# Patient Record
Sex: MALE | Race: WHITE | Employment: FULL TIME | URBAN - METROPOLITAN AREA
[De-identification: names, ages, dates, MRNs, and addresses within clinical notes are randomized per-mention and may not be internally consistent; named-entity substitution may affect disease eponyms.]

---

## 2018-12-21 ENCOUNTER — APPOINTMENT (OUTPATIENT)
Dept: CT IMAGING | Facility: CLINIC | Age: 53
End: 2018-12-21
Attending: EMERGENCY MEDICINE
Payer: COMMERCIAL

## 2018-12-21 ENCOUNTER — HOSPITAL ENCOUNTER (EMERGENCY)
Facility: CLINIC | Age: 53
Discharge: HOME | End: 2018-12-21
Attending: EMERGENCY MEDICINE | Admitting: EMERGENCY MEDICINE
Payer: COMMERCIAL

## 2018-12-21 VITALS
HEART RATE: 69 BPM | TEMPERATURE: 98 F | RESPIRATION RATE: 16 BRPM | HEIGHT: 69 IN | SYSTOLIC BLOOD PRESSURE: 108 MMHG | DIASTOLIC BLOOD PRESSURE: 64 MMHG | BODY MASS INDEX: 21.71 KG/M2 | OXYGEN SATURATION: 98 % | WEIGHT: 146.6 LBS

## 2018-12-21 DIAGNOSIS — R10.84 ABDOMINAL PAIN, GENERALIZED: ICD-10-CM

## 2018-12-21 DIAGNOSIS — R11.2 NON-INTRACTABLE VOMITING WITH NAUSEA, UNSPECIFIED VOMITING TYPE: ICD-10-CM

## 2018-12-21 LAB
ALBUMIN SERPL-MCNC: 3.9 G/DL (ref 3.4–5)
ALBUMIN UR-MCNC: NEGATIVE MG/DL
ALP SERPL-CCNC: 84 U/L (ref 40–150)
ALT SERPL W P-5'-P-CCNC: 24 U/L (ref 0–70)
ANION GAP SERPL CALCULATED.3IONS-SCNC: 8 MMOL/L (ref 3–14)
APPEARANCE UR: CLEAR
AST SERPL W P-5'-P-CCNC: 20 U/L (ref 0–45)
BASOPHILS # BLD AUTO: 0 10E9/L (ref 0–0.2)
BASOPHILS NFR BLD AUTO: 0.2 %
BILIRUB SERPL-MCNC: 2 MG/DL (ref 0.2–1.3)
BILIRUB UR QL STRIP: NEGATIVE
BUN SERPL-MCNC: 19 MG/DL (ref 7–30)
CALCIUM SERPL-MCNC: 8.8 MG/DL (ref 8.5–10.1)
CHLORIDE SERPL-SCNC: 108 MMOL/L (ref 94–109)
CO2 SERPL-SCNC: 27 MMOL/L (ref 20–32)
COLOR UR AUTO: ABNORMAL
CREAT SERPL-MCNC: 1.02 MG/DL (ref 0.66–1.25)
DIFFERENTIAL METHOD BLD: ABNORMAL
EOSINOPHIL # BLD AUTO: 0.4 10E9/L (ref 0–0.7)
EOSINOPHIL NFR BLD AUTO: 2.5 %
ERYTHROCYTE [DISTWIDTH] IN BLOOD BY AUTOMATED COUNT: 12.7 % (ref 10–15)
GFR SERPL CREATININE-BSD FRML MDRD: 84 ML/MIN/{1.73_M2}
GLUCOSE SERPL-MCNC: 136 MG/DL (ref 70–99)
GLUCOSE UR STRIP-MCNC: NEGATIVE MG/DL
HCT VFR BLD AUTO: 44 % (ref 40–53)
HGB BLD-MCNC: 15.5 G/DL (ref 13.3–17.7)
HGB UR QL STRIP: NEGATIVE
IMM GRANULOCYTES # BLD: 0 10E9/L (ref 0–0.4)
IMM GRANULOCYTES NFR BLD: 0.1 %
INTERPRETATION ECG - MUSE: NORMAL
KETONES UR STRIP-MCNC: NEGATIVE MG/DL
LACTATE BLD-SCNC: 2 MMOL/L (ref 0.7–2)
LEUKOCYTE ESTERASE UR QL STRIP: NEGATIVE
LIPASE SERPL-CCNC: 112 U/L (ref 73–393)
LYMPHOCYTES # BLD AUTO: 1.1 10E9/L (ref 0.8–5.3)
LYMPHOCYTES NFR BLD AUTO: 6.9 %
MCH RBC QN AUTO: 31.2 PG (ref 26.5–33)
MCHC RBC AUTO-ENTMCNC: 35.2 G/DL (ref 31.5–36.5)
MCV RBC AUTO: 89 FL (ref 78–100)
MONOCYTES # BLD AUTO: 1.1 10E9/L (ref 0–1.3)
MONOCYTES NFR BLD AUTO: 6.9 %
MUCOUS THREADS #/AREA URNS LPF: PRESENT /LPF
NEUTROPHILS # BLD AUTO: 13.1 10E9/L (ref 1.6–8.3)
NEUTROPHILS NFR BLD AUTO: 83.4 %
NITRATE UR QL: NEGATIVE
NRBC # BLD AUTO: 0 10*3/UL
NRBC BLD AUTO-RTO: 0 /100
PH UR STRIP: 7.5 PH (ref 5–7)
PLATELET # BLD AUTO: 239 10E9/L (ref 150–450)
POTASSIUM SERPL-SCNC: 3.8 MMOL/L (ref 3.4–5.3)
PROT SERPL-MCNC: 7.3 G/DL (ref 6.8–8.8)
RBC # BLD AUTO: 4.97 10E12/L (ref 4.4–5.9)
RBC #/AREA URNS AUTO: 0 /HPF (ref 0–2)
SODIUM SERPL-SCNC: 143 MMOL/L (ref 133–144)
SOURCE: ABNORMAL
SP GR UR STRIP: 1.03 (ref 1–1.03)
UROBILINOGEN UR STRIP-MCNC: NORMAL MG/DL (ref 0–2)
WBC # BLD AUTO: 15.7 10E9/L (ref 4–11)
WBC #/AREA URNS AUTO: 0 /HPF (ref 0–5)

## 2018-12-21 PROCEDURE — 25000128 H RX IP 250 OP 636: Performed by: EMERGENCY MEDICINE

## 2018-12-21 PROCEDURE — 74177 CT ABD & PELVIS W/CONTRAST: CPT

## 2018-12-21 PROCEDURE — 99285 EMERGENCY DEPT VISIT HI MDM: CPT | Mod: 25 | Performed by: EMERGENCY MEDICINE

## 2018-12-21 PROCEDURE — 93005 ELECTROCARDIOGRAM TRACING: CPT | Performed by: EMERGENCY MEDICINE

## 2018-12-21 PROCEDURE — 96374 THER/PROPH/DIAG INJ IV PUSH: CPT | Mod: 59 | Performed by: EMERGENCY MEDICINE

## 2018-12-21 PROCEDURE — 96361 HYDRATE IV INFUSION ADD-ON: CPT | Performed by: EMERGENCY MEDICINE

## 2018-12-21 PROCEDURE — 25000125 ZZHC RX 250: Performed by: EMERGENCY MEDICINE

## 2018-12-21 PROCEDURE — 83690 ASSAY OF LIPASE: CPT | Performed by: EMERGENCY MEDICINE

## 2018-12-21 PROCEDURE — 81001 URINALYSIS AUTO W/SCOPE: CPT | Performed by: EMERGENCY MEDICINE

## 2018-12-21 PROCEDURE — 93010 ELECTROCARDIOGRAM REPORT: CPT | Mod: Z6 | Performed by: EMERGENCY MEDICINE

## 2018-12-21 PROCEDURE — 80053 COMPREHEN METABOLIC PANEL: CPT | Performed by: EMERGENCY MEDICINE

## 2018-12-21 PROCEDURE — 99284 EMERGENCY DEPT VISIT MOD MDM: CPT | Mod: 25 | Performed by: EMERGENCY MEDICINE

## 2018-12-21 PROCEDURE — 85025 COMPLETE CBC W/AUTO DIFF WBC: CPT | Performed by: EMERGENCY MEDICINE

## 2018-12-21 PROCEDURE — 83605 ASSAY OF LACTIC ACID: CPT | Performed by: EMERGENCY MEDICINE

## 2018-12-21 RX ORDER — ONDANSETRON 4 MG/1
4 TABLET, ORALLY DISINTEGRATING ORAL EVERY 8 HOURS PRN
Qty: 10 TABLET | Refills: 0 | Status: SHIPPED | OUTPATIENT
Start: 2018-12-21 | End: 2018-12-24

## 2018-12-21 RX ORDER — IOPAMIDOL 755 MG/ML
100 INJECTION, SOLUTION INTRAVASCULAR ONCE
Status: COMPLETED | OUTPATIENT
Start: 2018-12-21 | End: 2018-12-21

## 2018-12-21 RX ORDER — ONDANSETRON 2 MG/ML
4 INJECTION INTRAMUSCULAR; INTRAVENOUS ONCE
Status: COMPLETED | OUTPATIENT
Start: 2018-12-21 | End: 2018-12-21

## 2018-12-21 RX ADMIN — IOPAMIDOL 71 ML: 755 INJECTION, SOLUTION INTRAVENOUS at 07:21

## 2018-12-21 RX ADMIN — SODIUM CHLORIDE 60 ML: 9 INJECTION, SOLUTION INTRAVENOUS at 07:24

## 2018-12-21 RX ADMIN — SODIUM CHLORIDE 1000 ML: 9 INJECTION, SOLUTION INTRAVENOUS at 05:59

## 2018-12-21 RX ADMIN — ONDANSETRON 4 MG: 2 INJECTION INTRAMUSCULAR; INTRAVENOUS at 06:00

## 2018-12-21 RX ADMIN — SODIUM CHLORIDE 1000 ML: 9 INJECTION, SOLUTION INTRAVENOUS at 06:37

## 2018-12-21 ASSESSMENT — ENCOUNTER SYMPTOMS
VOMITING: 1
HEADACHES: 0
SHORTNESS OF BREATH: 0
EYE REDNESS: 0
COLOR CHANGE: 0
DIARRHEA: 0
DIFFICULTY URINATING: 0
ARTHRALGIAS: 0
NAUSEA: 1
NECK STIFFNESS: 0
CONFUSION: 0
FEVER: 0
ABDOMINAL PAIN: 1

## 2018-12-21 ASSESSMENT — MIFFLIN-ST. JEOR: SCORE: 1505.35

## 2018-12-21 NOTE — DISCHARGE INSTRUCTIONS
Take ondansetron as needed for nausea.  Clear liquid diet-advance as tolerated.    Return to the emergency department if worse, fever, right lower abdominal pain, or other concerns.    Follow-up with your doctor next week as needed.

## 2018-12-21 NOTE — ED PROVIDER NOTES
"  History     Chief Complaint   Patient presents with     Abdominal Pain     c/o intermittent abdominal pain x3 days     Nausea     c/o intermittent nausea. Denies vomiting      HPI  Monico Callejas is a 53 year old male who presents to the emergency department with diffuse abdominal pain as well as nausea and vomiting developed diffuse abdominal pain over the day today.  He is nauseous and began to vomit here in the emergency department.  His last bowel movement was yesterday.  He denies a history of abdominal surgery.  Denies.  Patient denies any chest pain or dyspnea.  No cough.  Patient denies any dysuria, urgency, or frequency.  He denies any flank pain.    I have reviewed the Medications, Allergies, Past Medical and Surgical History, and Social History in the Epic system.    Review of Systems   Constitutional: Negative for fever.   HENT: Negative for congestion.    Eyes: Negative for redness.   Respiratory: Negative for shortness of breath.    Cardiovascular: Negative for chest pain.   Gastrointestinal: Positive for abdominal pain, nausea and vomiting. Negative for diarrhea.   Genitourinary: Negative for difficulty urinating.   Musculoskeletal: Negative for arthralgias and neck stiffness.   Skin: Negative for color change.   Neurological: Negative for headaches.   Psychiatric/Behavioral: Negative for confusion.   All other systems reviewed and are negative.      Physical Exam   BP: 123/70  Pulse: 69  Temp: 97.6  F (36.4  C)  Resp: 18  Height: 175.3 cm (5' 9\")  Weight: 66.5 kg (146 lb 9.6 oz)  SpO2: 100 %      Physical Exam   Constitutional: He appears well-developed and well-nourished. He appears distressed.   Actively vomiting   HENT:   Head: Atraumatic.   Mouth/Throat: Oropharynx is clear and moist.   Eyes: No scleral icterus.   Cardiovascular: Normal rate, regular rhythm, normal heart sounds and intact distal pulses.   Pulmonary/Chest: Effort normal and breath sounds normal. No respiratory distress. "   Abdominal: Soft. Bowel sounds are normal. There is no tenderness.   Musculoskeletal: Normal range of motion. He exhibits no edema or tenderness.   Neurological: He is alert.   Skin: Skin is warm. No rash noted. He is not diaphoretic.   Nursing note and vitals reviewed.      ED Course        Procedures             EKG Interpretation:      Interpreted by MARCOS LEMONS MD  Time reviewed: 0603  Symptoms at time of EKG: Nausea  Rhythm: normal sinus   Rate: 67  Axis: normal  Ectopy: none  Conduction: normal  ST Segments/ T Waves: No ST-T wave changes  Q Waves: none  Comparison to prior: No old EKG available    Clinical Impression: normal EKG    Results for orders placed or performed during the hospital encounter of 12/21/18   CBC with platelets differential   Result Value Ref Range    WBC 15.7 (H) 4.0 - 11.0 10e9/L    RBC Count 4.97 4.4 - 5.9 10e12/L    Hemoglobin 15.5 13.3 - 17.7 g/dL    Hematocrit 44.0 40.0 - 53.0 %    MCV 89 78 - 100 fl    MCH 31.2 26.5 - 33.0 pg    MCHC 35.2 31.5 - 36.5 g/dL    RDW 12.7 10.0 - 15.0 %    Platelet Count 239 150 - 450 10e9/L    Diff Method Automated Method     % Neutrophils 83.4 %    % Lymphocytes 6.9 %    % Monocytes 6.9 %    % Eosinophils 2.5 %    % Basophils 0.2 %    % Immature Granulocytes 0.1 %    Nucleated RBCs 0 0 /100    Absolute Neutrophil 13.1 (H) 1.6 - 8.3 10e9/L    Absolute Lymphocytes 1.1 0.8 - 5.3 10e9/L    Absolute Monocytes 1.1 0.0 - 1.3 10e9/L    Absolute Eosinophils 0.4 0.0 - 0.7 10e9/L    Absolute Basophils 0.0 0.0 - 0.2 10e9/L    Abs Immature Granulocytes 0.0 0 - 0.4 10e9/L    Absolute Nucleated RBC 0.0    Comprehensive metabolic panel   Result Value Ref Range    Sodium 143 133 - 144 mmol/L    Potassium 3.8 3.4 - 5.3 mmol/L    Chloride 108 94 - 109 mmol/L    Carbon Dioxide 27 20 - 32 mmol/L    Anion Gap 8 3 - 14 mmol/L    Glucose 136 (H) 70 - 99 mg/dL    Urea Nitrogen 19 7 - 30 mg/dL    Creatinine 1.02 0.66 - 1.25 mg/dL    GFR Estimate 84 >60 mL/min/[1.73_m2]    GFR  Estimate If Black >90 >60 mL/min/[1.73_m2]    Calcium 8.8 8.5 - 10.1 mg/dL    Bilirubin Total 2.0 (H) 0.2 - 1.3 mg/dL    Albumin 3.9 3.4 - 5.0 g/dL    Protein Total 7.3 6.8 - 8.8 g/dL    Alkaline Phosphatase 84 40 - 150 U/L    ALT 24 0 - 70 U/L    AST 20 0 - 45 U/L   Lipase   Result Value Ref Range    Lipase 112 73 - 393 U/L   Lactic acid whole blood   Result Value Ref Range    Lactic Acid 2.0 0.7 - 2.0 mmol/L          Critical Care time:      The Lactic acid level is elevated due to Abdominal pain and vomiting, at this time there is no sign of severe sepsis or septic shock.      Medications   0.9% sodium chloride BOLUS (1,000 mLs Intravenous New Bag 12/21/18 0637)   0.9% sodium chloride BOLUS (0 mLs Intravenous Stopped 12/21/18 0636)   ondansetron (ZOFRAN) injection 4 mg (4 mg Intravenous Given 12/21/18 0600)           Assessments & Plan (with Medical Decision Making)   53 year old male to the emergency department with diffuse abdominal pain and nausea.  Upon arrival to the emergency department, the patient began to vomit.  He has normal vital signs.  He was initially distressed but that resolved with IV fluids and ondansetron.  His abdominal examination was soft and without focal tenderness.  Laboratory evaluation was remarkable for leukocytosis of almost 16,000.  The remainder of his labs are normal.  The patient will undergo a CT of the abdomen/pelvis.  Urinalysis will also be evaluated.  If studies negative and patient continues to feel clinically well, he will be discharged home with ondansetron.  The patient signed out at change of shift pending abdominal CT and urinalysis results.    I have reviewed the nursing notes.    I have reviewed the findings, diagnosis, plan and need for follow up with the patient.       Medication List      Started    ondansetron 4 MG ODT tab  Commonly known as:  ZOFRAN ODT  4 mg, Oral, EVERY 8 HOURS PRN            Final diagnoses:   Abdominal pain, generalized   Non-intractable  vomiting with nausea, unspecified vomiting type       12/21/2018   [facility]     Francisco Madrid MD  12/21/18 6925

## 2018-12-21 NOTE — ED NOTES
8:18 AM  Patient was signed out to me is a 53-year-old male who presented with approximately 3-day history of abdominal discomfort associated with an episode of vomiting this morning.  At the time of signout, a CT scan of the abdomen/pelvis as well as urinalysis were pending.  Results of both were obtained and are essentially unremarkable and do not explain his ongoing abdominal discomfort.  He does feel improved and therefore will be discharged with prescription for Zofran, appropriate diet and follow-up with a primary physician.     Sushant Hernandez MD  12/21/18 0819

## 2018-12-21 NOTE — ED AVS SNAPSHOT
[facility], [place], Emergency Department  [address]  [facility] [address]  Phone:  [phone]  Fax:  [phone]                                    Monico Callejas   MRN: [id number]    Department:  [facility], Emergency Department   Date of Visit:  12/21/2018           After Visit Summary Signature Page    I have received my discharge instructions, and my questions have been answered. I have discussed any challenges I see with this plan with the nurse or doctor.    ..........................................................................................................................................  Patient/Patient Representative Signature      ..........................................................................................................................................  Patient Representative Print Name and Relationship to Patient    ..................................................               ................................................  Date                                   Time    ..........................................................................................................................................  Reviewed by Signature/Title    ...................................................              ..............................................  Date                                               Time          22EPIC Rev 08/18

## 2019-04-03 ENCOUNTER — TELEPHONE (OUTPATIENT)
Dept: OTOLARYNGOLOGY | Facility: CLINIC | Age: 54
End: 2019-04-03

## 2019-04-03 NOTE — TELEPHONE ENCOUNTER
FUTURE VISIT INFORMATION      FUTURE VISIT INFORMATION:    Date: 4/8/19    Time: 10:30AM    [place] [facility] [place] INFORMATION:    Referring provider:  bc    Referring providers clinic:  self    Reason for visit/diagnosis  lump in neck     RECORDS REQUESTED FROM:       Maryann tony

## 2019-04-03 NOTE — TELEPHONE ENCOUNTER
Left VM with patient letting him know of appt with Tatianna Parks and that if it doesn't work for him he can call me back directly.

## 2019-04-04 ENCOUNTER — DOCUMENTATION ONLY (OUTPATIENT)
Dept: CARE COORDINATION | Facility: CLINIC | Age: 54
End: 2019-04-04

## 2019-04-05 ENCOUNTER — TELEPHONE (OUTPATIENT)
Dept: OTOLARYNGOLOGY | Facility: CLINIC | Age: 54
End: 2019-04-05

## 2019-04-05 NOTE — TELEPHONE ENCOUNTER
Patient called and cancelled his CT and appointment with Tatianna Parks because he wants have his care done at [facility] instead, since it is closer to his home.

## 2019-04-08 ENCOUNTER — PRE VISIT (OUTPATIENT)
Dept: OTOLARYNGOLOGY | Facility: CLINIC | Age: 54
End: 2019-04-08

## 2019-05-03 NOTE — PROGRESS NOTES
ENT Consultation    Monico Callejas who is a 54 year old male seen in consultation at the request of self.      History of Present Illness - Monico Callejas is a 54 year old male   Lump left side of neck for about 4 yrs with no changes.  Patient was seen by an otolaryngologist in [place] who offered him further work-up within [facility] however patient is out of network in that system and wanted to go back to therapy.  Basically the mass has not grown to the left side of the neck anterior to the angle of the mandible.  He is a non-smoker and denies any problems with swallowing any problems voice any pain from this area.  Body mass index is 21.65 kg/m .        BP Readings from Last 1 Encounters:   12/21/18 108/64       BP noted to be well controlled today in office.     Monico IS NOT a smoker/uses chewing tobacco.  Monico       Past Medical History - No past medical history on file.    Current Medications - No current outpatient medications on file.    Allergies - No Known Allergies    Social History -   Social History     Socioeconomic History     Marital status: Single     Spouse name: Not on file     Number of children: Not on file     Years of education: Not on file     Highest education level: Not on file   Occupational History     Not on file   Social Needs     Financial resource strain: Not on file     Food insecurity:     Worry: Not on file     Inability: Not on file     Transportation needs:     Medical: Not on file     Non-medical: Not on file   Tobacco Use     Smoking status: Never Smoker     Smokeless tobacco: Never Used   Substance and Sexual Activity     Alcohol use: Yes     Comment: occ     Drug use: No     Sexual activity: Not on file   Lifestyle     Physical activity:     Days per week: Not on file     Minutes per session: Not on file     Stress: Not on file   Relationships     Social connections:     Talks on phone: Not on file     Gets together: Not on file     Attends Nondenominational service: Not on  "file     Active member of club or organization: Not on file     Attends meetings of clubs or organizations: Not on file     Relationship status: Not on file     Intimate partner violence:     Fear of current or ex partner: Not on file     Emotionally abused: Not on file     Physically abused: Not on file     Forced sexual activity: Not on file   Other Topics Concern     Not on file   Social History Narrative     Not on file       Family History - No family history on file.    Review of Systems - As per HPI and PMHx, otherwise review of system review of the head and neck negative. Otherwise 10+ review of system is negative    Physical Exam  Ht 1.753 m (5' 9\")   BMI 21.65 kg/m    BMI: Body mass index is 21.65 kg/m .    General - The patient is well nourished and well developed, and appears to have good nutritional status.  Alert and oriented to person and place, answers questions and cooperates with examination appropriately.    SKIN - No suspicious lesions or rashes.  Respiration - No respiratory distress.  Head and Face - Normocephalic and atraumatic, with no gross asymmetry noted of the contour of the facial features.  The facial nerve is intact, with strong symmetric movements.    Voice and Breathing - The patient was breathing comfortably without the use of accessory muscles. The patients voice was clear and strong, and had appropriate pitch and quality.    Ears - Bilateral pinna and EACs with normal appearing overlying skin. Tympanic membrane intact with good mobility on pneumatic otoscopy bilaterally. Bony landmarks of the ossicular chain are normal. The tympanic membranes are normal in appearance. No retraction, perforation, or masses.  No fluid or purulence was seen in the external canal or the middle ear.     Eyes - Extraocular movements intact.  Sclera were not icteric or injected, conjunctiva were pink and moist.    Mouth - Examination of the oral cavity showed pink, healthy oral mucosa. No lesions or " ulcerations noted.  The tongue was mobile and midline, and the dentition were in good condition.      Throat - The walls of the oropharynx were smooth, pink, moist, symmetric, and had no lesions or ulcerations.  The tonsillar pillars and soft palate were symmetric.  The uvula was midline on elevation.    Neck - Normal midline excursion of the laryngotracheal complex during swallowing.  Full range of motion on passive movement.  Palpation of the occipital, submental, submandibular, internal jugular chain, and supraclavicular nodes did  demonstrate about 2-1/2 cm mass that appears to be soft mobile not immediately subcutaneous however.  No other masses appreciated in the neck..  The carotid pulse was palpable bilaterally.  Palpation of the thyroid was soft and smooth, with no nodules or goiter appreciated.  The trachea was mobile and midline.    Nose - External contour is symmetric, no gross deflection or scars.  Nasal mucosa is pink and moist with no abnormal mucus.  The septum was midline and non-obstructive, turbinates of normal size and position.  No polyps, masses, or purulence noted on examination.    Neuro - Nonfocal neuro exam is normal, CN 2 through 12 intact, normal gait and muscle tone.      Performed in clinic today:  Fine-needle aspiration biopsy:  Skin was prepped with isopropyl alcohol.  Using 22-gauge needle with 10 mL syringe 3 passes were made through the lesion in the anterior neck.  Aspirate was sprayed on slides and submitted in side effects to pathology.  Hemostasis was controlled with local pressure patient tolerated procedure well.      A/P - Monico Callejas is a 54 year old male with left neck mass that certainly is in the area of tail of the parotid so therefore could be tail of parotid tumor versus deeper lipoma.  Will await the results of FNA also patient is interested in obtaining CT scan of the soft tissues of the neck for further evaluation I will scheduled accordingly.  We will contact  the patient next week with the results of both and decide a further course of therapy.      Lenny Duarte MD

## 2019-05-13 ENCOUNTER — TELEPHONE (OUTPATIENT)
Dept: OTOLARYNGOLOGY | Facility: CLINIC | Age: 54
End: 2019-05-13

## 2019-05-13 ENCOUNTER — OFFICE VISIT (OUTPATIENT)
Dept: OTOLARYNGOLOGY | Facility: CLINIC | Age: 54
End: 2019-05-13
Payer: COMMERCIAL

## 2019-05-13 VITALS
HEIGHT: 69 IN | TEMPERATURE: 94.7 F | BODY MASS INDEX: 22.22 KG/M2 | SYSTOLIC BLOOD PRESSURE: 135 MMHG | WEIGHT: 150 LBS | DIASTOLIC BLOOD PRESSURE: 79 MMHG

## 2019-05-13 DIAGNOSIS — R22.1 NECK MASS: Primary | ICD-10-CM

## 2019-05-13 DIAGNOSIS — Z12.11 SPECIAL SCREENING FOR MALIGNANT NEOPLASMS, COLON: ICD-10-CM

## 2019-05-13 PROCEDURE — 99203 OFFICE O/P NEW LOW 30 MIN: CPT | Mod: 25 | Performed by: OTOLARYNGOLOGY

## 2019-05-13 PROCEDURE — 10021 FNA BX W/O IMG GDN 1ST LES: CPT | Performed by: OTOLARYNGOLOGY

## 2019-05-13 PROCEDURE — 88173 CYTOPATH EVAL FNA REPORT: CPT | Performed by: OTOLARYNGOLOGY

## 2019-05-13 ASSESSMENT — MIFFLIN-ST. JEOR: SCORE: 1515.78

## 2019-05-13 ASSESSMENT — PAIN SCALES - GENERAL: PAINLEVEL: NO PAIN (0)

## 2019-05-13 NOTE — LETTER
[address] [address]  [phone]        May 21, 2019    Monico Callejas  [address] [place] [address]

## 2019-05-13 NOTE — LETTER
[facility] [address] [address]  [phone]      May 15, 2019      Monico Callejas  [address] [place] [address]      Dear Monico:     To better serve you, we are sending this letter to notify you that we have attempted to contact you by telephone to schedule the following procedure(s) ordered by your physician.     ___x____   Colonoscopy   _______   Upper GI Endoscopy (EGD)   _______   Colonoscopy and Upper GI Endoscopy    To provide the highest quality of care, we strongly encourage you to call and schedule the prescribed test/procedure at your earliest convenience.   The number to the Specialty Scheduling department is [phone] and the hours are 8:00am - 4:30pm Monday through Friday.   We look forward to hearing from you.    Sincerely,    [facility]

## 2019-05-13 NOTE — LETTER

## 2019-05-13 NOTE — LETTER
5/13/2019         RE: Monico Callejas  [address] [place] [address]        Dear Colleague,    Thank you for referring your patient, Monico Callejas, to the [facility]. Please see a copy of my visit note below.    ENT Consultation    Monico Callejas who is a 54 year old male seen in consultation at the request of self.      History of Present Illness - Monico Callejas is a 54 year old male   Lump left side of neck for about 4 yrs with no changes.  Patient was seen by an otolaryngologist in [place] who offered him further work-up within [facility] however patient is out of network in that system and wanted to go back to therapy.  Basically the mass has not grown to the left side of the neck anterior to the angle of the mandible.  He is a non-smoker and denies any problems with swallowing any problems voice any pain from this area.  Body mass index is 21.65 kg/m .        BP Readings from Last 1 Encounters:   12/21/18 108/64       BP noted to be well controlled today in office.     Monico IS NOT a smoker/uses chewing tobacco.  Monico       Past Medical History - No past medical history on file.    Current Medications - No current outpatient medications on file.    Allergies - No Known Allergies    Social History -   Social History     Socioeconomic History     Marital status: Single     Spouse name: Not on file     Number of children: Not on file     Years of education: Not on file     Highest education level: Not on file   Occupational History     Not on file   Social Needs     Financial resource strain: Not on file     Food insecurity:     Worry: Not on file     Inability: Not on file     Transportation needs:     Medical: Not on file     Non-medical: Not on file   Tobacco Use     Smoking status: Never Smoker     Smokeless tobacco: Never Used   Substance and Sexual Activity     Alcohol use: Yes     Comment: occ     Drug use: No     Sexual activity: Not on file   Lifestyle      "Physical activity:     Days per week: Not on file     Minutes per session: Not on file     Stress: Not on file   Relationships     Social connections:     Talks on phone: Not on file     Gets together: Not on file     Attends Oriental orthodox service: Not on file     Active member of club or organization: Not on file     Attends meetings of clubs or organizations: Not on file     Relationship status: Not on file     Intimate partner violence:     Fear of current or ex partner: Not on file     Emotionally abused: Not on file     Physically abused: Not on file     Forced sexual activity: Not on file   Other Topics Concern     Not on file   Social History Narrative     Not on file       Family History - No family history on file.    Review of Systems - As per HPI and PMHx, otherwise review of system review of the head and neck negative. Otherwise 10+ review of system is negative    Physical Exam  Ht 1.753 m (5' 9\")   BMI 21.65 kg/m     BMI: Body mass index is 21.65 kg/m .    General - The patient is well nourished and well developed, and appears to have good nutritional status.  Alert and oriented to person and place, answers questions and cooperates with examination appropriately.    SKIN - No suspicious lesions or rashes.  Respiration - No respiratory distress.  Head and Face - Normocephalic and atraumatic, with no gross asymmetry noted of the contour of the facial features.  The facial nerve is intact, with strong symmetric movements.    Voice and Breathing - The patient was breathing comfortably without the use of accessory muscles. The patients voice was clear and strong, and had appropriate pitch and quality.    Ears - Bilateral pinna and EACs with normal appearing overlying skin. Tympanic membrane intact with good mobility on pneumatic otoscopy bilaterally. Bony landmarks of the ossicular chain are normal. The tympanic membranes are normal in appearance. No retraction, perforation, or masses.  No fluid or purulence " was seen in the external canal or the middle ear.     Eyes - Extraocular movements intact.  Sclera were not icteric or injected, conjunctiva were pink and moist.    Mouth - Examination of the oral cavity showed pink, healthy oral mucosa. No lesions or ulcerations noted.  The tongue was mobile and midline, and the dentition were in good condition.      Throat - The walls of the oropharynx were smooth, pink, moist, symmetric, and had no lesions or ulcerations.  The tonsillar pillars and soft palate were symmetric.  The uvula was midline on elevation.    Neck - Normal midline excursion of the laryngotracheal complex during swallowing.  Full range of motion on passive movement.  Palpation of the occipital, submental, submandibular, internal jugular chain, and supraclavicular nodes did  demonstrate about 2-1/2 cm mass that appears to be soft mobile not immediately subcutaneous however.  No other masses appreciated in the neck..  The carotid pulse was palpable bilaterally.  Palpation of the thyroid was soft and smooth, with no nodules or goiter appreciated.  The trachea was mobile and midline.    Nose - External contour is symmetric, no gross deflection or scars.  Nasal mucosa is pink and moist with no abnormal mucus.  The septum was midline and non-obstructive, turbinates of normal size and position.  No polyps, masses, or purulence noted on examination.    Neuro - Nonfocal neuro exam is normal, CN 2 through 12 intact, normal gait and muscle tone.      Performed in clinic today:  Fine-needle aspiration biopsy:  Skin was prepped with isopropyl alcohol.  Using 22-gauge needle with 10 mL syringe 3 passes were made through the lesion in the anterior neck.  Aspirate was sprayed on slides and submitted in side effects to pathology.  Hemostasis was controlled with local pressure patient tolerated procedure well.      A/P - Monico Callejas is a 54 year old male with left neck mass that certainly is in the area of tail of the  parotid so therefore could be tail of parotid tumor versus deeper lipoma.  Will await the results of FNA also patient is interested in obtaining CT scan of the soft tissues of the neck for further evaluation I will scheduled accordingly.  We will contact the patient next week with the results of both and decide a further course of therapy.      Lenny Duarte MD    Again, thank you for allowing me to participate in the care of your patient.        Sincerely,        Lenny Duarte MD, MD

## 2019-05-13 NOTE — TELEPHONE ENCOUNTER
Left message for patient to return call to schedule EGD/colonoscopy. If Poppy or Dulce are not available, please transfer to same day surgery

## 2019-05-15 LAB — COPATH REPORT: NORMAL

## 2019-05-15 NOTE — TELEPHONE ENCOUNTER
Left message for patient to return call to schedule EGD/colonoscopy. If Poppy or Dulce are not available, please transfer to same day surgery        X3, letter mailed

## 2019-05-21 NOTE — TELEPHONE ENCOUNTER
Date of colonoscopy/EGD: 6/12/19  Surgeon: Dr. Choi  Prep:Miralax  Packet:Colonoscopy/EGD instructions mailed to patient's home address.   Date: 5/21/2019      Surgery Scheduler

## 2019-06-03 ENCOUNTER — HOSPITAL ENCOUNTER (OUTPATIENT)
Dept: CT IMAGING | Facility: CLINIC | Age: 54
Discharge: HOME | End: 2019-06-03
Attending: OTOLARYNGOLOGY | Admitting: OTOLARYNGOLOGY
Payer: COMMERCIAL

## 2019-06-03 DIAGNOSIS — R22.1 NECK MASS: ICD-10-CM

## 2019-06-03 PROCEDURE — 25000128 H RX IP 250 OP 636: Performed by: OTOLARYNGOLOGY

## 2019-06-03 PROCEDURE — 70491 CT SOFT TISSUE NECK W/DYE: CPT

## 2019-06-03 PROCEDURE — 25000125 ZZHC RX 250: Performed by: OTOLARYNGOLOGY

## 2019-06-03 RX ORDER — IOPAMIDOL 755 MG/ML
500 INJECTION, SOLUTION INTRAVASCULAR ONCE
Status: COMPLETED | OUTPATIENT
Start: 2019-06-03 | End: 2019-06-03

## 2019-06-03 RX ADMIN — SODIUM CHLORIDE 68 ML: 9 INJECTION, SOLUTION INTRAVENOUS at 09:18

## 2019-06-03 RX ADMIN — IOPAMIDOL 80 ML: 755 INJECTION, SOLUTION INTRAVENOUS at 09:18

## 2019-06-11 ENCOUNTER — TELEPHONE (OUTPATIENT)
Dept: ORTHOPEDICS | Facility: CLINIC | Age: 54
End: 2019-06-11

## 2019-06-11 NOTE — TELEPHONE ENCOUNTER
Two attempts by RN yesterday to reach patient. No answer again today. Will try again later.     China TSE RN. . .  6/11/2019, 11:03 AM

## 2019-06-11 NOTE — TELEPHONE ENCOUNTER
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 However, I would like the patient to see my partner Dr. Reyes for a second opinion in [facility] office.  Lenny Duarte MD

## 2019-06-12 ENCOUNTER — ANESTHESIA (OUTPATIENT)
Dept: GASTROENTEROLOGY | Facility: CLINIC | Age: 54
End: 2019-06-12
Payer: COMMERCIAL

## 2019-06-12 ENCOUNTER — ANESTHESIA EVENT (OUTPATIENT)
Dept: GASTROENTEROLOGY | Facility: CLINIC | Age: 54
End: 2019-06-12
Payer: COMMERCIAL

## 2019-06-12 ENCOUNTER — HOSPITAL ENCOUNTER (OUTPATIENT)
Facility: CLINIC | Age: 54
Discharge: HOME | End: 2019-06-12
Attending: SURGERY | Admitting: SURGERY
Payer: COMMERCIAL

## 2019-06-12 VITALS
TEMPERATURE: 98.2 F | OXYGEN SATURATION: 98 % | SYSTOLIC BLOOD PRESSURE: 128 MMHG | RESPIRATION RATE: 16 BRPM | DIASTOLIC BLOOD PRESSURE: 80 MMHG

## 2019-06-12 LAB — COLONOSCOPY: NORMAL

## 2019-06-12 PROCEDURE — G0121 COLON CA SCRN NOT HI RSK IND: HCPCS | Performed by: SURGERY

## 2019-06-12 PROCEDURE — 25000125 ZZHC RX 250: Performed by: NURSE ANESTHETIST, CERTIFIED REGISTERED

## 2019-06-12 PROCEDURE — 37000008 ZZH ANESTHESIA TECHNICAL FEE, 1ST 30 MIN: Performed by: SURGERY

## 2019-06-12 PROCEDURE — 25000128 H RX IP 250 OP 636: Performed by: NURSE ANESTHETIST, CERTIFIED REGISTERED

## 2019-06-12 PROCEDURE — 45378 DIAGNOSTIC COLONOSCOPY: CPT | Performed by: SURGERY

## 2019-06-12 PROCEDURE — 25800030 ZZH RX IP 258 OP 636: Performed by: NURSE ANESTHETIST, CERTIFIED REGISTERED

## 2019-06-12 RX ORDER — LIDOCAINE HYDROCHLORIDE 20 MG/ML
INJECTION, SOLUTION INFILTRATION; PERINEURAL PRN
Status: DISCONTINUED | OUTPATIENT
Start: 2019-06-12 | End: 2019-06-12

## 2019-06-12 RX ORDER — SODIUM CHLORIDE, SODIUM LACTATE, POTASSIUM CHLORIDE, CALCIUM CHLORIDE 600; 310; 30; 20 MG/100ML; MG/100ML; MG/100ML; MG/100ML
INJECTION, SOLUTION INTRAVENOUS CONTINUOUS
Status: CANCELLED | OUTPATIENT
Start: 2019-06-12

## 2019-06-12 RX ORDER — ONDANSETRON 4 MG/1
4 TABLET, ORALLY DISINTEGRATING ORAL EVERY 30 MIN PRN
Status: CANCELLED | OUTPATIENT
Start: 2019-06-12

## 2019-06-12 RX ORDER — ONDANSETRON 2 MG/ML
4 INJECTION INTRAMUSCULAR; INTRAVENOUS EVERY 30 MIN PRN
Status: CANCELLED | OUTPATIENT
Start: 2019-06-12

## 2019-06-12 RX ORDER — ALBUTEROL SULFATE 0.83 MG/ML
2.5 SOLUTION RESPIRATORY (INHALATION) EVERY 4 HOURS PRN
Status: CANCELLED | OUTPATIENT
Start: 2019-06-12

## 2019-06-12 RX ORDER — PROPOFOL 10 MG/ML
INJECTION, EMULSION INTRAVENOUS PRN
Status: DISCONTINUED | OUTPATIENT
Start: 2019-06-12 | End: 2019-06-12

## 2019-06-12 RX ORDER — PROPOFOL 10 MG/ML
INJECTION, EMULSION INTRAVENOUS CONTINUOUS PRN
Status: DISCONTINUED | OUTPATIENT
Start: 2019-06-12 | End: 2019-06-12

## 2019-06-12 RX ORDER — SODIUM CHLORIDE, SODIUM LACTATE, POTASSIUM CHLORIDE, CALCIUM CHLORIDE 600; 310; 30; 20 MG/100ML; MG/100ML; MG/100ML; MG/100ML
INJECTION, SOLUTION INTRAVENOUS CONTINUOUS
Status: DISCONTINUED | OUTPATIENT
Start: 2019-06-12 | End: 2019-06-12 | Stop reason: HOSPADM

## 2019-06-12 RX ORDER — LIDOCAINE 40 MG/G
CREAM TOPICAL
Status: DISCONTINUED | OUTPATIENT
Start: 2019-06-12 | End: 2019-06-12 | Stop reason: HOSPADM

## 2019-06-12 RX ADMIN — PROPOFOL 70 MG: 10 INJECTION, EMULSION INTRAVENOUS at 11:46

## 2019-06-12 RX ADMIN — PROPOFOL 200 MCG/KG/MIN: 10 INJECTION, EMULSION INTRAVENOUS at 11:46

## 2019-06-12 RX ADMIN — LIDOCAINE HYDROCHLORIDE 40 MG: 20 INJECTION, SOLUTION INFILTRATION; PERINEURAL at 11:46

## 2019-06-12 RX ADMIN — LIDOCAINE HYDROCHLORIDE 1 ML: 10 INJECTION, SOLUTION EPIDURAL; INFILTRATION; INTRACAUDAL; PERINEURAL at 10:40

## 2019-06-12 RX ADMIN — SODIUM CHLORIDE, POTASSIUM CHLORIDE, SODIUM LACTATE AND CALCIUM CHLORIDE: 600; 310; 30; 20 INJECTION, SOLUTION INTRAVENOUS at 10:40

## 2019-06-12 NOTE — TELEPHONE ENCOUNTER
Informed patient. He was actually getting checked in for a different procedure currently so he will call back later to schedule to appt with Dr. Reyes in [place] for a second opinion on the neck mass per Dr. Georges..     China TSE RN. . .  6/12/2019, 9:47 AM

## 2019-06-12 NOTE — ANESTHESIA PREPROCEDURE EVALUATION
Anesthesia Pre-Procedure Evaluation    Patient: Monico Callejas   MRN: [id number] : 1965          Preoperative Diagnosis: Special screening for malignant neoplasms, colon    Procedure(s):  COLONOSCOPY    History reviewed. No pertinent past medical history.  History reviewed. No pertinent surgical history.    Anesthesia Evaluation     . Pt has not had prior anesthetic            ROS/MED HX    ENT/Pulmonary: Comment: Mass on the jaw line being looked at for lipoma vs parotid tail issue      Neurologic:  - neg neurologic ROS     Cardiovascular:     (+) ----. : . . . :. . Previous cardiac testing date:results:date: results:ECG reviewed date:18 results:SR date: results:          METS/Exercise Tolerance:     Hematologic:  - neg hematologic  ROS       Musculoskeletal: Comment: CLBP        GI/Hepatic:  - neg GI/hepatic ROS      (-) GERD   Renal/Genitourinary:  - ROS Renal section negative       Endo:  - neg endo ROS       Psychiatric:     (+) psychiatric history anxiety      Infectious Disease:  - neg infectious disease ROS       Malignancy:      - no malignancy   Other:    - neg other ROS                      Physical Exam  Normal systems: cardiovascular, pulmonary and dental    Airway   Mallampati: II  TM distance: >3 FB  Neck ROM: full    Dental     Cardiovascular   Rhythm and rate: regular and normal      Pulmonary    breath sounds clear to auscultation            Lab Results   Component Value Date    WBC 15.7 (H) 2018    HGB 15.5 2018    HCT 44.0 2018     2018     2018    POTASSIUM 3.8 2018    CHLORIDE 108 2018    CO2 27 2018    BUN 19 2018    CR 1.02 2018     (H) 2018    JOLEEN 8.8 2018    ALBUMIN 3.9 2018    PROTTOTAL 7.3 2018    ALT 24 2018    AST 20 2018    ALKPHOS 84 2018    BILITOTAL 2.0 (H) 2018    LIPASE 112 2018       Preop Vitals  BP Readings from Last 3 Encounters:  "  05/13/19 135/79   12/21/18 108/64    Pulse Readings from Last 3 Encounters:   12/21/18 69      Resp Readings from Last 3 Encounters:   12/21/18 16    SpO2 Readings from Last 3 Encounters:   12/21/18 98%      Temp Readings from Last 1 Encounters:   05/13/19 94.7  F (34.8  C)    Ht Readings from Last 1 Encounters:   05/13/19 1.753 m (5' 9\")      Wt Readings from Last 1 Encounters:   05/13/19 68 kg (150 lb)    Estimated body mass index is 22.15 kg/m  as calculated from the following:    Height as of 5/13/19: 1.753 m (5' 9\").    Weight as of 5/13/19: 68 kg (150 lb).       Anesthesia Plan      History & Physical Review  History and physical reviewed and following examination; no interval change.    ASA Status:  2 .    NPO Status:  > 8 hours    Plan for MAC with Intravenous and Propofol induction. Maintenance will be TIVA.  Reason for MAC:  Deep or markedly invasive procedure (G8)    Dr Choi performed the H&P pre-op      Postoperative Care      Consents  Anesthetic plan, risks, benefits and alternatives discussed with:  Patient.  Use of blood products discussed: No .   .                 LISA Lara CRNA  "

## 2019-06-12 NOTE — DISCHARGE INSTRUCTIONS
[facility]    Home Care Following Endoscopy          Activity:    You have just undergone an endoscopic procedure usually performed with conscious sedation.  Do not work or operate machinery (including a car) for at least 12 hours.      I encourage you to walk and attempt to pass this air as soon as possible.    Diet:    Return to the diet you were on before your procedure but eat lightly for the first 12-24 hours.    Drink plenty of water.    Resume any regular medications unless otherwise advised by your physician.  Please begin any new medication prescribed as a result of your procedure as directed by your physician.     If you had any biopsy or polyp removed please refrain from aspirin or aspirin products for 2 days.  If on Coumadin please restart as instructed by your physician.   Pain:    You may take Tylenol as needed for pain.  Expected Recovery:    You can expect some mild abdominal fullness and/or discomfort due to the air used to inflate your intestinal tract. It is also normal to have a mild sore throat after upper endoscopy.    Call Your Physician if You Have:    After Upper Endoscopy:  o Shoulder, back or chest pain.  o Difficulty breathing or swallowing.  o Vomiting blood.    After Colonoscopy:  o Worsening persisting abdominal pain which is worse with activity.  o Fevers (>101 degrees F), chills or shakes.  o Passage of continued blood with bowel movements.   Any questions or concerns about your recovery, please call [phone] or after hours [phone] Nurse Advice Line.    Follow-up Care:    You should receive a call or letter with your results within 1 week. Please call if you have not received a notification of your results.  If asked to return to clinic please make an appointment 1 week after your procedure.  Call [phone].

## 2019-06-12 NOTE — H&P
"[facility]    Pre-Endoscopy History and Physical     Monico Callejas MRN# [id number]   YOB: 1965 Age: 54 year old     Date of Procedure: 6/12/2019  Primary care provider: Magno Hwang  Type of Endoscopy: Colonoscopy with possible biopsy, possible polypectomy  Reason for Procedure: screening  Type of Anesthesia Anticipated: MAC    HPI:    Monico is a 54 year old male who will be undergoing the above procedure.  1st screening colonoscopy; no PMH; no meds; no PSH; no famhx of colon cancer  A history and physical has been performed. The patient's medications and allergies have been reviewed. The risks and benefits of the procedure and the sedation options and risks were discussed with the patient.  All questions were answered and informed consent was obtained.      He denies a personal or family history of anesthesia complications or bleeding disorders.     There is no problem list on file for this patient.       History reviewed. No pertinent past medical history.     History reviewed. No pertinent surgical history.    Social History     Tobacco Use     Smoking status: Never Smoker     Smokeless tobacco: Never Used   Substance Use Topics     Alcohol use: Yes     Comment: occ       History reviewed. No pertinent family history.    Prior to Admission medications    Medication Sig Start Date End Date Taking? Authorizing Provider   ondansetron (ZOFRAN ODT) 4 MG ODT tab Take 1 tablet (4 mg) by mouth every 8 hours as needed for nausea 12/21/18 12/24/18  Francisco Madrid MD       No Known Allergies     REVIEW OF SYSTEMS:   5 point ROS negative except as noted above in HPI, including Gen., Resp., CV, GI &  system review.    PHYSICAL EXAM:   BP (!) 146/94   Temp 98.2  F (36.8  C) (Oral)   Resp 16   SpO2 99%  Estimated body mass index is 22.15 kg/m  as calculated from the following:    Height as of 5/13/19: 1.753 m (5' 9\").    Weight as of 5/13/19: 68 kg (150 lb).   Constitutional: Awake, " alert, no acute distress.  Eyes: No scleral icterus.  Conjunctiva are without injection.  ENMT: Mucous membranes moist, dentition and gums are intact.   Neck: Soft, supple, trachea midline.    Endocrine: The thyroid is without masses and mobile with swallow.   Lymphatic: There is no cervical, submandibular adenopathy.  Respiratory: Lungs are clear to auscultation and percussion bilaterally.   Cardiovascular: Regular rate and rhythm. No murmurs, rubs, or gallops.    Abdomen: Non-distended, non-tender, normoactive bowel sounds present, No masses,  Musculoskeletal: No spinal or CVA tenderness. Full range of motion in the upper and lower extremities.    Skin: No skin rashes or lesions to inspection.  No petechia.    Neurologic: Cranial nerves II through XII are grossly intact and symmetric.  Psychiatric: The patient is alert and oriented times 3.  The patient's affect is not blunted and mood is appropriate.  DIAGNOSTICS:    Not indicated    IMPRESSION   ASA Class 1 - Healthy patient, no medical problems    PLAN:   Plan for Colonoscopy with possible biopsy, possible polypectomy. We discussed the risks, benefits and alternatives and the patient wished to proceed.  Patient is cleared for the above procedure.    The above has been forwarded to the consulting provider.    [facility], [facility], [facility] General Surgery

## 2019-06-12 NOTE — ANESTHESIA CARE TRANSFER NOTE
Patient: Monico Callejas    Procedure(s):  COLONOSCOPY    Diagnosis: Special screening for malignant neoplasms, colon  Diagnosis Additional Information: No value filed.    Anesthesia Type:   MAC     Note:  Airway :Face Mask  Patient transferred to:Phase II  Handoff Report: Identifed the Patient, Identified the Reponsible Provider, Reviewed the pertinent medical history, Discussed the surgical course, Reviewed Intra-OP anesthesia mangement and issues during anesthesia, Set expectations for post-procedure period and Allowed opportunity for questions and acknowledgement of understanding      Vitals: (Last set prior to Anesthesia Care Transfer)    CRNA VITALS  6/12/2019 1136 - 6/12/2019 1214      6/12/2019             EKG:  Sinus bradycardia                Electronically Signed By: LISA Lara CRNA  June 12, 2019  12:14 PM

## 2019-06-12 NOTE — ANESTHESIA POSTPROCEDURE EVALUATION
Patient: Monico Callejas    Procedure(s):  COLONOSCOPY    Diagnosis:Special screening for malignant neoplasms, colon  Diagnosis Additional Information: No value filed.    Anesthesia Type:  MAC    Note:  Anesthesia Post Evaluation    Patient location during evaluation: Phase 2  Patient participation: Able to fully participate in evaluation  Level of consciousness: awake  Pain management: adequate  Airway patency: patent  Cardiovascular status: acceptable and hemodynamically stable  Respiratory status: acceptable, room air and nonlabored ventilation  Hydration status: stable  PONV: none     Anesthetic complications: None    Comments: Patient was happy with the anesthesia care received and no anesthesia related complications were noted.  I will follow up with the patient again if it is needed.        Last vitals:  Vitals:    06/12/19 1022 06/12/19 1208   BP: (!) 146/94 102/63   Resp: 16 16   Temp: 98.2  F (36.8  C)    SpO2: 99% 99%         Electronically Signed By: LISA Lara CRNA  June 12, 2019  12:14 PM

## 2020-05-27 NOTE — RESULT ENCOUNTER NOTE
This was reviewed by Dr. Duarte and discussed with patient on 6/11/19 and requires no action at this time.     China TSE RN. . .  5/27/2020, 8:56 AM

## 2021-03-01 ENCOUNTER — HOSPITAL ENCOUNTER (EMERGENCY)
Facility: CLINIC | Age: 56
Discharge: HOME | End: 2021-03-01
Attending: EMERGENCY MEDICINE | Admitting: EMERGENCY MEDICINE
Payer: COMMERCIAL

## 2021-03-01 VITALS
RESPIRATION RATE: 16 BRPM | HEART RATE: 84 BPM | DIASTOLIC BLOOD PRESSURE: 80 MMHG | OXYGEN SATURATION: 95 % | SYSTOLIC BLOOD PRESSURE: 132 MMHG | TEMPERATURE: 98.2 F

## 2021-03-01 DIAGNOSIS — R42 DIZZINESS: ICD-10-CM

## 2021-03-01 LAB
ALBUMIN SERPL-MCNC: 4.2 G/DL (ref 3.4–5)
ALBUMIN UR-MCNC: NEGATIVE MG/DL
ALP SERPL-CCNC: 91 U/L (ref 40–150)
ALT SERPL W P-5'-P-CCNC: 23 U/L (ref 0–70)
ANION GAP SERPL CALCULATED.3IONS-SCNC: 7 MMOL/L (ref 3–14)
APPEARANCE UR: CLEAR
AST SERPL W P-5'-P-CCNC: 18 U/L (ref 0–45)
BASOPHILS # BLD AUTO: 0.1 10E9/L (ref 0–0.2)
BASOPHILS NFR BLD AUTO: 0.6 %
BILIRUB SERPL-MCNC: 2.1 MG/DL (ref 0.2–1.3)
BILIRUB UR QL STRIP: NEGATIVE
BUN SERPL-MCNC: 12 MG/DL (ref 7–30)
CALCIUM SERPL-MCNC: 9 MG/DL (ref 8.5–10.1)
CHLORIDE SERPL-SCNC: 107 MMOL/L (ref 94–109)
CO2 SERPL-SCNC: 28 MMOL/L (ref 20–32)
COLOR UR AUTO: ABNORMAL
CREAT SERPL-MCNC: 0.92 MG/DL (ref 0.66–1.25)
DIFFERENTIAL METHOD BLD: NORMAL
EOSINOPHIL # BLD AUTO: 0.3 10E9/L (ref 0–0.7)
EOSINOPHIL NFR BLD AUTO: 4.1 %
ERYTHROCYTE [DISTWIDTH] IN BLOOD BY AUTOMATED COUNT: 12.7 % (ref 10–15)
GFR SERPL CREATININE-BSD FRML MDRD: >90 ML/MIN/{1.73_M2}
GLUCOSE SERPL-MCNC: 100 MG/DL (ref 70–99)
GLUCOSE UR STRIP-MCNC: NEGATIVE MG/DL
HCT VFR BLD AUTO: 44.1 % (ref 40–53)
HGB BLD-MCNC: 15.7 G/DL (ref 13.3–17.7)
HGB UR QL STRIP: NEGATIVE
IMM GRANULOCYTES # BLD: 0 10E9/L (ref 0–0.4)
IMM GRANULOCYTES NFR BLD: 0.4 %
INTERPRETATION ECG - MUSE: NORMAL
KETONES UR STRIP-MCNC: NEGATIVE MG/DL
LEUKOCYTE ESTERASE UR QL STRIP: NEGATIVE
LYMPHOCYTES # BLD AUTO: 1.9 10E9/L (ref 0.8–5.3)
LYMPHOCYTES NFR BLD AUTO: 23.3 %
MCH RBC QN AUTO: 31.2 PG (ref 26.5–33)
MCHC RBC AUTO-ENTMCNC: 35.6 G/DL (ref 31.5–36.5)
MCV RBC AUTO: 88 FL (ref 78–100)
MONOCYTES # BLD AUTO: 0.4 10E9/L (ref 0–1.3)
MONOCYTES NFR BLD AUTO: 4.6 %
NEUTROPHILS # BLD AUTO: 5.5 10E9/L (ref 1.6–8.3)
NEUTROPHILS NFR BLD AUTO: 67 %
NITRATE UR QL: NEGATIVE
NRBC # BLD AUTO: 0 10*3/UL
NRBC BLD AUTO-RTO: 0 /100
PH UR STRIP: 7 PH (ref 5–7)
PLATELET # BLD AUTO: 260 10E9/L (ref 150–450)
POTASSIUM SERPL-SCNC: 3.6 MMOL/L (ref 3.4–5.3)
PROT SERPL-MCNC: 7.2 G/DL (ref 6.8–8.8)
RBC # BLD AUTO: 5.03 10E12/L (ref 4.4–5.9)
RBC #/AREA URNS AUTO: <1 /HPF (ref 0–2)
SODIUM SERPL-SCNC: 142 MMOL/L (ref 133–144)
SOURCE: ABNORMAL
SP GR UR STRIP: 1 (ref 1–1.03)
TROPONIN I SERPL-MCNC: <0.015 UG/L (ref 0–0.04)
UROBILINOGEN UR STRIP-MCNC: NORMAL MG/DL (ref 0–2)
WBC # BLD AUTO: 8.2 10E9/L (ref 4–11)
WBC #/AREA URNS AUTO: <1 /HPF (ref 0–5)

## 2021-03-01 PROCEDURE — 99284 EMERGENCY DEPT VISIT MOD MDM: CPT | Performed by: EMERGENCY MEDICINE

## 2021-03-01 PROCEDURE — 80053 COMPREHEN METABOLIC PANEL: CPT | Performed by: EMERGENCY MEDICINE

## 2021-03-01 PROCEDURE — 93005 ELECTROCARDIOGRAM TRACING: CPT | Performed by: EMERGENCY MEDICINE

## 2021-03-01 PROCEDURE — 81001 URINALYSIS AUTO W/SCOPE: CPT | Performed by: EMERGENCY MEDICINE

## 2021-03-01 PROCEDURE — 99284 EMERGENCY DEPT VISIT MOD MDM: CPT | Mod: 25 | Performed by: EMERGENCY MEDICINE

## 2021-03-01 PROCEDURE — 85025 COMPLETE CBC W/AUTO DIFF WBC: CPT | Performed by: EMERGENCY MEDICINE

## 2021-03-01 PROCEDURE — 93010 ELECTROCARDIOGRAM REPORT: CPT | Performed by: EMERGENCY MEDICINE

## 2021-03-01 PROCEDURE — 250N000013 HC RX MED GY IP 250 OP 250 PS 637: Performed by: EMERGENCY MEDICINE

## 2021-03-01 PROCEDURE — 84484 ASSAY OF TROPONIN QUANT: CPT | Performed by: EMERGENCY MEDICINE

## 2021-03-01 RX ORDER — CYCLOBENZAPRINE HCL 10 MG
10 TABLET ORAL 3 TIMES DAILY PRN
COMMUNITY

## 2021-03-01 RX ORDER — MELOXICAM 15 MG/1
15 TABLET ORAL DAILY
COMMUNITY

## 2021-03-01 RX ORDER — MECLIZINE HYDROCHLORIDE 25 MG/1
25 TABLET ORAL ONCE
Status: COMPLETED | OUTPATIENT
Start: 2021-03-01 | End: 2021-03-01

## 2021-03-01 RX ORDER — MECLIZINE HCL 12.5 MG 12.5 MG/1
12.5 TABLET ORAL 4 TIMES DAILY PRN
Qty: 30 TABLET | Refills: 0 | Status: SHIPPED | OUTPATIENT
Start: 2021-03-01

## 2021-03-01 RX ADMIN — MECLIZINE HYDROCHLORIDE 25 MG: 25 TABLET ORAL at 09:56

## 2021-03-01 NOTE — DISCHARGE INSTRUCTIONS
Please make an appointment to follow up with Your Primary Care Provider as soon as possible if not improving.    Can take antivert for the dizziness if returns.     Return to the ED if you develop worsening dizziness, loss of consciousness, chest pain, shortness of breath, fever, vomiting, numbness, tingling, weakness, confusion, speech difficulty, vision changes, or any new or worsening concerns.

## 2021-03-01 NOTE — LETTER
March 1, 2021      To Whom It May Concern:      Monico Callejas was seen in our Emergency Department today, 03/01/21.  I expect his condition to improve over the next 1 days.  He may return to work/school when improved.    Sincerely,        Miriam Kc MD

## 2021-03-01 NOTE — ED PROVIDER NOTES
ED Provider Note  [facility]      History     Chief Complaint   Patient presents with     Dizziness     Light headed, started 0830     The history is provided by the patient and medical records.     Monico Callejas is a 55 year old male with a past medical history significant for hyperlipidemia, depression, anxiety and OCD who presents to the ED for evaluation of dizziness.  Around 8:30 AM this morning, the patient reports having an onset of room spinning dizziness while he was on break after going to the bathroom.  The patient did note that his dizziness was worse after he stood up from his chair while on break.  He states that he drank a lot of water today and had to go the bathroom frequently.  He asked one of the nurses to take his blood pressure who noticed that it was 140-180.  The patient states that he has not had any dizziness since being in the ED.  The patient has been taking a new medication, flexeril, from his back pain over the last 4 days.  He reports that he has had headaches with associated speech difficulty but has never had any associated dizziness with this.  The patient has had neck stiffness in the past but this is chronic.  The patient states that he does not take any blood pressure medications but does take a few supplements. No syncope.  He has not had any heart problems.  The patient's father did have high blood pressure and also  of a heart attack when he was 58.  He denies using tobacco, alcohol or drugs.  The patient denies any recent injuries or falls.  He denies any recent chemical exposure.  He denies any history of blood clots.  The patient denies any Covid exposure.  He denies any headache, blurry vision, double vision, neck pain, chest pain, shortness of breath, weakness, numbness, tingling, fever, cough, rhinorrhea, ear pain, tinnitus, nausea, vomiting, speech difficulty, diarrhea or leg swelling.    Past Medical History  Past Medical History:    Diagnosis Date     Anxiety      Depressive disorder      Past Surgical History:   Procedure Laterality Date     COLONOSCOPY N/A 6/12/2019    Procedure: COLONOSCOPY;  Surgeon: Neil Choi MD;  Location:  GI     cyclobenzaprine (FLEXERIL) 10 MG tablet  meclizine (ANTIVERT) 12.5 MG tablet  meloxicam (MOBIC) 15 MG tablet      No Known Allergies  Family History  No family history on file.  Social History   Social History     Tobacco Use     Smoking status: Never Smoker     Smokeless tobacco: Never Used   Substance Use Topics     Alcohol use: Yes     Comment: occ     Drug use: No      Past medical history, past surgical history, medications, allergies, family history, and social history were reviewed with the patient. No additional pertinent items.       Review of Systems  A complete review of systems was performed with pertinent positives and negatives noted in the HPI, and all other systems negative.    Physical Exam   BP: (!) 159/101  Pulse: 84  Temp: 98.2  F (36.8  C)  Resp: 14  SpO2: 100 %  Physical Exam  Vitals signs reviewed.   Constitutional:       General: He is not in acute distress.     Appearance: He is well-developed.   HENT:      Head: Normocephalic and atraumatic.      Right Ear: Tympanic membrane normal.      Left Ear: Tympanic membrane normal.      Nose: Nose normal.      Mouth/Throat:      Mouth: Mucous membranes are moist.      Pharynx: No oropharyngeal exudate.   Eyes:      General: No visual field deficit.     Extraocular Movements: Extraocular movements intact.      Conjunctiva/sclera: Conjunctivae normal.      Pupils: Pupils are equal, round, and reactive to light.      Comments: No nystagmus noted on exam.    Neck:      Musculoskeletal: Normal range of motion and neck supple. No neck rigidity.      Vascular: No carotid bruit.   Cardiovascular:      Rate and Rhythm: Normal rate and regular rhythm.      Pulses: Normal pulses.      Heart sounds: Normal heart sounds. No murmur.   Pulmonary:       Effort: Pulmonary effort is normal. No respiratory distress.      Breath sounds: Normal breath sounds. No stridor. No wheezing or rales.   Abdominal:      General: Bowel sounds are normal. There is no distension.      Palpations: Abdomen is soft. There is no mass.      Tenderness: There is no abdominal tenderness. There is no guarding or rebound.   Musculoskeletal: Normal range of motion.         General: No swelling or tenderness.   Skin:     General: Skin is warm and dry.      Capillary Refill: Capillary refill takes less than 2 seconds.      Findings: No rash.   Neurological:      General: No focal deficit present.      Mental Status: He is alert and oriented to person, place, and time.      GCS: GCS eye subscore is 4. GCS verbal subscore is 5. GCS motor subscore is 6.      Cranial Nerves: Cranial nerves are intact. No cranial nerve deficit, dysarthria or facial asymmetry.      Sensory: Sensation is intact. No sensory deficit.      Motor: Motor function is intact. No weakness, tremor, abnormal muscle tone or pronator drift.      Coordination: Coordination normal. Finger-Nose-Finger Test and Heel to Shin Test normal.      Deep Tendon Reflexes: Babinski sign absent on the right side. Babinski sign absent on the left side.      Reflex Scores:       Brachioradialis reflexes are 2+ on the right side and 2+ on the left side.       Patellar reflexes are 2+ on the right side and 2+ on the left side.     Comments: 5 5 strength in all 4 extremities bilaterally.  Sensation intact light touch in all 4 extremities bilaterally.  Finger-to-nose and heel-to-shin intact bilaterally.  No obvious nystagmus.  Vision is grossly normal.  No visual field deficits.  Speech is normal.  Cranial nerves II through XII are intact bilaterally.   Psychiatric:         Mood and Affect: Mood normal.         ED Course     ED Course as of Mar 31 2202   Mon Mar 01, 2021   0931 Had discussion with patient for shared decision making regarding imaging.  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 He states that he would prefer not to have this imaging done. He appears to have capacity to make this decision and is able to voice understanding of the risks of missing a life threatening etiology to his symptoms. He declines imaging at this time.          9:08 AM  The patient was seen and examined by Miraim Kc MD in Room ED04.   Procedures             EKG Interpretation:      Interpreted by Miriam Kc MD  Time reviewed: 9:38 am  Symptoms at time of EKG: dizziness   Rhythm: normal sinus   Rate: normal  Axis: normal  Ectopy: none  Conduction: incomplete RBBB  ST Segments/ T Waves: No ST-T wave changes  Q Waves: none  Comparison to prior: Unchanged from 12/21/18    Clinical Impression: no evidence of acute ischemia.  Normal sinus rhythm.  Previous incomplete right bundle branch block is unchanged.  Otherwise normal intervals.  No evidence of WPW or Brugada syndrome.  Possible voltage criteria for LVH however this again looks unchanged from prior EKG.               Results for orders placed or performed during the hospital encounter of 03/01/21   CBC with platelets differential     Status: None   Result Value Ref Range    WBC 8.2 4.0 - 11.0 10e9/L    RBC Count 5.03 4.4 - 5.9 10e12/L    Hemoglobin 15.7 13.3 - 17.7 g/dL    Hematocrit 44.1 40.0 - 53.0 %    MCV 88 78 - 100 fl    MCH 31.2 26.5 - 33.0 pg    MCHC 35.6 31.5 - 36.5 g/dL    RDW 12.7 10.0 - 15.0 %    Platelet Count 260 150 - 450 10e9/L    Diff Method Automated Method     % Neutrophils 67.0 %    % Lymphocytes 23.3 %    % Monocytes 4.6 %    % Eosinophils 4.1 %    % Basophils 0.6 %    % Immature Granulocytes 0.4 %    Nucleated RBCs 0 0 /100    Absolute Neutrophil 5.5 1.6 - 8.3  10e9/L    Absolute Lymphocytes 1.9 0.8 - 5.3 10e9/L    Absolute Monocytes 0.4 0.0 - 1.3 10e9/L    Absolute Eosinophils 0.3 0.0 - 0.7 10e9/L    Absolute Basophils 0.1 0.0 - 0.2 10e9/L    Abs Immature Granulocytes 0.0 0 - 0.4 10e9/L    Absolute Nucleated RBC 0.0    Comprehensive metabolic panel     Status: Abnormal   Result Value Ref Range    Sodium 142 133 - 144 mmol/L    Potassium 3.6 3.4 - 5.3 mmol/L    Chloride 107 94 - 109 mmol/L    Carbon Dioxide 28 20 - 32 mmol/L    Anion Gap 7 3 - 14 mmol/L    Glucose 100 (H) 70 - 99 mg/dL    Urea Nitrogen 12 7 - 30 mg/dL    Creatinine 0.92 0.66 - 1.25 mg/dL    GFR Estimate >90 >60 mL/min/[1.73_m2]    GFR Estimate If Black >90 >60 mL/min/[1.73_m2]    Calcium 9.0 8.5 - 10.1 mg/dL    Bilirubin Total 2.1 (H) 0.2 - 1.3 mg/dL    Albumin 4.2 3.4 - 5.0 g/dL    Protein Total 7.2 6.8 - 8.8 g/dL    Alkaline Phosphatase 91 40 - 150 U/L    ALT 23 0 - 70 U/L    AST 18 0 - 45 U/L   Troponin I     Status: None   Result Value Ref Range    Troponin I ES <0.015 0.000 - 0.045 ug/L   UA with Microscopic     Status: Abnormal   Result Value Ref Range    Color Urine Straw     Appearance Urine Clear     Glucose Urine Negative NEG^Negative mg/dL    Bilirubin Urine Negative NEG^Negative    Ketones Urine Negative NEG^Negative mg/dL    Specific Gravity Urine 1.002 (L) 1.003 - 1.035    Blood Urine Negative NEG^Negative    pH Urine 7.0 5.0 - 7.0 pH    Protein Albumin Urine Negative NEG^Negative mg/dL    Urobilinogen mg/dL Normal 0.0 - 2.0 mg/dL    Nitrite Urine Negative NEG^Negative    Leukocyte Esterase Urine Negative NEG^Negative    Source Urine     WBC Urine <1 0 - 5 /HPF    RBC Urine <1 0 - 2 /HPF   EKG 12 lead     Status: None   Result Value Ref Range    Interpretation ECG Click View Image link to view waveform and result      Medications   meclizine (ANTIVERT) tablet 25 mg (25 mg Oral Given 3/1/21 0956)        Assessments & Plan (with Medical Decision Making)   Patient works here in the hospital up  in the OR and presents down for evaluation of sudden onset of room spinning dizziness.  Initially had some slight difficulty describing this but when we had further discussion he states it was definitely more of a room spinning and not a sensation that he was going to pass out.  He denies any associated chest pains palpitations or shortness of breath.  He noted that his blood pressure was high and he does not typically have high blood pressure.  He denied any numbness, tingling, weakness.  He did not lose consciousness.  No vision changes.  He states he still has the sensation here somewhat worse with movement.  He also states that he has history of migraine with aura after exertion.  Differential diagnosis includes but is not limited to benign positional vertigo or other peripheral vertigo, central vertigo, intracranial hemorrhage, intracranial mass, dissection, aneurysm.  Also considered the potential of a cardiac etiology however he clarifies that this is more of a vertiginous symptom making this less likely.  We did obtain an EKG which is unchanged from prior EKGs with an incomplete right bundle branch block that was there previously as well as possible voltage criteria for LVH which again was there previously.  No evidence of acute ischemia, WPW, or Brugada syndrome.  No evidence of arrhythmia.  Normal intervals.  He again denies any chest pain or shortness of breath and thus we do feel cardiac etiology is somewhat less likely.  Initial troponin here is negative at less than 0.015.  CMP and CBC are largely unremarkable.  White blood cell count is 8.2.  Hemoglobin is 15.7.  Urinalysis is unremarkable.  Did recommend imaging and please see discussion below with the patient.      ED Course as of Mar 31 2202   Mon Mar 01, 2021   0931 Had discussion with patient for shared decision making regarding imaging. 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 He states that he would prefer not to have this imaging done. He appears to have capacity to make this decision and is able to voice understanding of the risks of missing a life threatening etiology to his symptoms. He declines imaging at this time.         He was given meclizine here.  Again he declined any imaging including discussion of any chest x-ray.  On reevaluation he reported his symptoms were entirely improved and his blood pressure was improving as well.  He was ambulating here in the jamies without difficulty or ataxia.  He did not have any focal neurologic deficits on my exam and no evidence of cerebellar dysfunction on my exam.  Did discuss with the patient that we were also considering cardiac etiology and that again we would recommend imaging and also at times we put people in the observation unit for further work-up.  He voiced understanding but declined this and with shared decision-making stated that he would prefer to be discharged home as he is feeling improved.  He understands the risks of not performing the imaging as above and missing potential life-threatening etiologies.  He has capacity to make this decision.  He was given a prescription for meclizine.  He was advised to follow closely with his primary care provider for recheck of his blood pressure.  He was given strict return precautions to the ER.  He voiced understanding.  He was discharged home in stable condition.    I have reviewed the nursing notes. I have reviewed the findings, diagnosis, plan and need for follow up with the patient.    Discharge Medication List as of 3/1/2021 11:50 AM      START taking these medications    Details   meclizine (ANTIVERT) 12.5 MG tablet Take 1 tablet (12.5 mg) by mouth 4 times daily as needed for dizziness, Disp-30 tablet, R-0, Local Print              Final diagnoses:   Dizziness       --  I, Johan Rondon, am serving as a trained medical scribe to document services personally performed by Miriam Kc MD, based on the provider's statements to me.     Miriam LOPEZ MD, was physically present and have reviewed and verified the accuracy of this note documented by Johan Rondon.    Miriam Kc MD  [facility] EMERGENCY DEPARTMENT  3/1/2021     Miriam Kc MD  03/31/21 [id number]